# Patient Record
Sex: MALE | Race: ASIAN | Employment: STUDENT | URBAN - METROPOLITAN AREA
[De-identification: names, ages, dates, MRNs, and addresses within clinical notes are randomized per-mention and may not be internally consistent; named-entity substitution may affect disease eponyms.]

---

## 2018-10-13 ENCOUNTER — HOSPITAL ENCOUNTER (EMERGENCY)
Facility: HOSPITAL | Age: 23
Discharge: HOME | End: 2018-10-13
Attending: EMERGENCY MEDICINE
Payer: COMMERCIAL

## 2018-10-13 ENCOUNTER — APPOINTMENT (OUTPATIENT)
Dept: CT IMAGING | Facility: HOSPITAL | Age: 23
End: 2018-10-13
Attending: EMERGENCY MEDICINE
Payer: COMMERCIAL

## 2018-10-13 VITALS
BODY MASS INDEX: 43.81 KG/M2 | HEIGHT: 70 IN | HEART RATE: 109 BPM | DIASTOLIC BLOOD PRESSURE: 119 MMHG | RESPIRATION RATE: 18 BRPM | OXYGEN SATURATION: 97 % | SYSTOLIC BLOOD PRESSURE: 158 MMHG | WEIGHT: 306 LBS | TEMPERATURE: 97 F

## 2018-10-13 DIAGNOSIS — R56.9 SEIZURE (HCC): Primary | ICD-10-CM

## 2018-10-13 PROCEDURE — 80320 DRUG SCREEN QUANTALCOHOLS: CPT | Performed by: EMERGENCY MEDICINE

## 2018-10-13 PROCEDURE — 99285 EMERGENCY DEPT VISIT HI MDM: CPT

## 2018-10-13 PROCEDURE — 83735 ASSAY OF MAGNESIUM: CPT | Performed by: EMERGENCY MEDICINE

## 2018-10-13 PROCEDURE — 80329 ANALGESICS NON-OPIOID 1 OR 2: CPT | Performed by: EMERGENCY MEDICINE

## 2018-10-13 PROCEDURE — 85025 COMPLETE CBC W/AUTO DIFF WBC: CPT | Performed by: EMERGENCY MEDICINE

## 2018-10-13 PROCEDURE — 36415 COLL VENOUS BLD VENIPUNCTURE: CPT

## 2018-10-13 PROCEDURE — 80053 COMPREHEN METABOLIC PANEL: CPT | Performed by: EMERGENCY MEDICINE

## 2018-10-13 PROCEDURE — 99284 EMERGENCY DEPT VISIT MOD MDM: CPT

## 2018-10-13 PROCEDURE — 70450 CT HEAD/BRAIN W/O DYE: CPT | Performed by: EMERGENCY MEDICINE

## 2018-10-13 RX ORDER — LORAZEPAM 1 MG/1
0.5 TABLET ORAL ONCE
Status: COMPLETED | OUTPATIENT
Start: 2018-10-13 | End: 2018-10-13

## 2018-10-13 RX ORDER — MIDAZOLAM HYDROCHLORIDE 5 MG/ML
10 INJECTION INTRAMUSCULAR; INTRAVENOUS ONCE
Status: DISCONTINUED | OUTPATIENT
Start: 2018-10-13 | End: 2018-10-13

## 2018-10-13 NOTE — ED INITIAL ASSESSMENT (HPI)
Nonverbal, autistic patient with seizure activity while at therapy today (seen but neurologist but no diagnosed seizures per family); ambulance arrived while patient in post-itctal state per family.

## 2018-10-13 NOTE — ED NOTES
Pt in four point restraints, by RN, PCT Fadi and security at bedside per Mom's request in order to obtain blood. Patient tolerated. Unable to advance IV catheter but able to obtain blood for sampling.

## 2018-10-13 NOTE — ED INITIAL ASSESSMENT (HPI)
Nonverbal, autistic patient with seizure activity while at therapy today; ambulance arrived while patient in post-itctal state per family.

## 2018-10-13 NOTE — ED PROVIDER NOTES
Patient Seen in: [facility] Emergency Department    History   Patient presents with:  Seizure Disorder (neurologic)    Stated Complaint: austistic seizure    HPI    Patient is a 28-year-old autistic male, nonverbal, presenting for evaluation after a muscles are intact. Pupils are equal and reactive to light. Oropharynx is pink and moist.  NECK: Neck is supple and nontender. The trachea is midline. LUNGS: Lungs are clear to auscultation bilaterally, respirations are unlabored.    HEART: Regular rate a CONFIRMATION, URINE   RAINBOW DRAW BLUE   RAINBOW DRAW LAVENDER   RAINBOW DRAW LIGHT GREEN   RAINBOW DRAW GOLD        Ct Brain Or Head (17955)    Result Date: 10/13/2018  PROCEDURE:  CT BRAIN OR HEAD (68630)  COMPARISON:  None.   INDICATIONS:  austistic sei and intervention may be required, depending on the patient's clinical course. Patient should be brought back for reevaluation of any problems, worsening symptoms, or as discussed.     Family verbalizes understanding and is comfortable with the plan as pratibha

## 2018-10-13 NOTE — ED NOTES
Patient anxious, pacing back and forth, family requesting something to calm him down. MD notified, see new orders.

## 2018-10-16 ENCOUNTER — OFFICE VISIT (OUTPATIENT)
Dept: NEUROLOGY | Facility: CLINIC | Age: 23
End: 2018-10-16
Payer: COMMERCIAL

## 2018-10-16 VITALS
WEIGHT: 315 LBS | RESPIRATION RATE: 16 BRPM | SYSTOLIC BLOOD PRESSURE: 138 MMHG | HEIGHT: 66 IN | BODY MASS INDEX: 50.62 KG/M2 | HEART RATE: 74 BPM | DIASTOLIC BLOOD PRESSURE: 83 MMHG

## 2018-10-16 DIAGNOSIS — R56.9 NEW ONSET SEIZURE (HCC): Primary | ICD-10-CM

## 2018-10-16 DIAGNOSIS — F84.0 AUTISM: ICD-10-CM

## 2018-10-16 PROCEDURE — 1111F DSCHRG MED/CURRENT MED MERGE: CPT | Performed by: OTHER

## 2018-10-16 PROCEDURE — 99204 OFFICE O/P NEW MOD 45 MIN: CPT | Performed by: OTHER

## 2018-10-16 RX ORDER — DIAZEPAM 10 MG/2ML
10 GEL RECTAL ONCE AS NEEDED
Qty: 100 MG | Refills: 0 | Status: SHIPPED | OUTPATIENT
Start: 2018-10-16 | End: 2018-10-16

## 2018-10-16 RX ORDER — TOPIRAMATE 50 MG/1
100 TABLET, FILM COATED ORAL 2 TIMES DAILY
COMMUNITY

## 2018-10-16 NOTE — H&P
[facility]    Fortino Metzger is a 26year old male. Referring MD: No ref.  provider found    Patient presents with:  Hospital F/U: Patient had a seziure on 10/13/18      HPI:    [facility] surgical history.   Social History    Tobacco Use      Smoking status: Never Smoker      Smokeless tobacco: Never Used    Alcohol use: Not on file    Drug use: Not on file    Family History   Problem Relation Age of Onset   • Diabetes Father    • Hypertensi Supple; full range of motion; no carotid bruits    Mental status:  Awake and alert, nonverbal at baseline due to autism, able to follow some simple commands with mimicking.      Fundoscopic Exam: unable to visualize bilaterally    Cranial Nerves: II through another episode back in July where he fell on the ground and seemed confused and agree with empirically starting on Topamax as per PCP while awaiting further workup.      In addition, will complete workup for first seizure, attempt to obtain MRI brain and E

## 2018-10-16 NOTE — PATIENT INSTRUCTIONS
Please continue Topamax 50 mg nightly, increase to 100 mg daily in ~ 5 days   Have EEG done  Have MRI done  If seizure happens, try rectal Diastat 5 mg   Refill policies:    • Allow 2-3 business days for refills; controlled substances may take longer.   • C contact their insurance carrier directly to determine coverage. If test is done without insurance authorization, patient may be responsible for the entire amount billed. Precertification and Prior Authorizations:   If your physician has recommended th prepped with a mild abrasive for good conductivity. · Electrodes are applied with paste and gauze. Paste is water soluble so most of it will be cleaned out of your hair with a warm washcloth before you leave.   If you like, you can bring a hat to wear aft

## 2018-10-17 ENCOUNTER — TELEPHONE (OUTPATIENT)
Dept: NEUROLOGY | Facility: CLINIC | Age: 23
End: 2018-10-17

## 2018-10-17 NOTE — TELEPHONE ENCOUNTER
Received notification that PCP is no longer with [facility]. New address/contact information located, Care Team updated. Letter forwarded to PCP at new fax number.

## 2018-11-23 VITALS — HEIGHT: 66 IN | BODY MASS INDEX: 50.62 KG/M2 | WEIGHT: 315 LBS

## 2018-11-23 RX ORDER — ACETAMINOPHEN 500 MG
1000 TABLET ORAL ONCE
Status: CANCELLED | OUTPATIENT
Start: 2018-11-23 | End: 2018-11-23

## 2018-11-23 RX ORDER — SODIUM CHLORIDE, SODIUM LACTATE, POTASSIUM CHLORIDE, CALCIUM CHLORIDE 600; 310; 30; 20 MG/100ML; MG/100ML; MG/100ML; MG/100ML
INJECTION, SOLUTION INTRAVENOUS CONTINUOUS
Status: CANCELLED | OUTPATIENT
Start: 2018-11-23

## 2018-11-27 NOTE — PAT NURSING NOTE
Left message with call back number on mothers mobile voicemail informing that patient may have water and/or gatorade up to 4 hours prior to procedure per anesthesia.

## 2018-11-30 ENCOUNTER — HOSPITAL ENCOUNTER (OUTPATIENT)
Dept: MRI IMAGING | Facility: HOSPITAL | Age: 23
Discharge: HOME | End: 2018-11-30
Attending: Other
Payer: COMMERCIAL

## 2019-08-13 ENCOUNTER — TELEPHONE (OUTPATIENT)
Dept: NEUROLOGY | Facility: CLINIC | Age: 24
End: 2019-08-13

## (undated) NOTE — LETTER
10/16/18          Jo Terrazas  :  4/15/1995      To Whom It May Concern: This patient was seen in our office on 10/16/18 . Patient has a new onset of seizure.   He is on anti-seizure medication and cleared to return to therapy in which he was

## (undated) NOTE — LETTER
10/16/18    Dear Dr. Anselmo Moore MD        I saw your patient, Dominik March for an initial visit. Please see my H&P note enclosed below. Let me know if you have any questions.     Thank you  Tal Cbarera MD, Neurology  [facility] Unable to obtain ROS due to nonverbal status.  Per family, they  deny any recent weight change, fevers, chills, nausea, double vision/ blurry vision / loss of vision, chest pain, palpitations, shortness of breath, rashes, joint pains, bowel / bladder inco large)   Pulse 74   Resp 16   Ht 66\"   Wt (!) 360 lb   BMI 58.11 kg/m²   Estimated body mass index is 58.11 kg/m² as calculated from the following:    Height as of this encounter: 66\". Weight as of this encounter: 360 lb.     GENERAL: well developed, w CONCLUSION:  No acute intracranial abnormality identified.     Dictated by: Lul Turner MD on 10/13/2018 at 17:28     Approved by: Lul Turner MD              IMPRESSION AND PLAN:   Felipe Essex is a 26year old male with PMHx significant for with plan of care as noted above; patient' father allowed to ask questionss and all questions answered to the best of my ability     Copy of note was sent to PCP      Hakeem Brown MD, Neurology  [facility]  Pager [phone]  10/16/2

## (undated) NOTE — ED AVS SNAPSHOT
Leonides Gray   MRN: [id number]    Department:  [facility] Emergency Department   Date of Visit:  10/13/2018           Disclosure     Insurance plans vary and the physician(s) referred by the ER may not be covered by your plan.  Please contact tell this physician (or your personal doctor if your instructions are to return to your personal doctor) about any new or lasting problems. The primary care or specialist physician will see patients referred from the [facility] Emergency Department.  Yun Jade

## (undated) NOTE — LETTER
08/13/2019    Jo Terrazas  [address] [place] [place] [id number]    Dear Luigi Nunez,    We are contacting you from Dr. Arlene Pope office. Your health is important to us.   Therefore, we are sending this friendly reminder that you have the following